# Patient Record
Sex: FEMALE | Race: WHITE | NOT HISPANIC OR LATINO | URBAN - METROPOLITAN AREA
[De-identification: names, ages, dates, MRNs, and addresses within clinical notes are randomized per-mention and may not be internally consistent; named-entity substitution may affect disease eponyms.]

---

## 2023-08-23 ENCOUNTER — HOSPITAL ENCOUNTER (OUTPATIENT)
Dept: DATA CONVERSION | Facility: HOSPITAL | Age: 38
Discharge: HOME | End: 2023-08-23

## 2023-08-23 DIAGNOSIS — R10.813 RIGHT LOWER QUADRANT ABDOMINAL TENDERNESS: ICD-10-CM

## 2023-08-24 ENCOUNTER — HOSPITAL ENCOUNTER (OUTPATIENT)
Dept: DATA CONVERSION | Facility: HOSPITAL | Age: 38
Discharge: HOME | End: 2023-08-24

## 2023-08-24 DIAGNOSIS — R22.32 LOCALIZED SWELLING, MASS AND LUMP, LEFT UPPER LIMB: ICD-10-CM

## 2023-09-15 VITALS
SYSTOLIC BLOOD PRESSURE: 108 MMHG | HEART RATE: 80 BPM | TEMPERATURE: 98.4 F | BODY MASS INDEX: 37.05 KG/M2 | WEIGHT: 217 LBS | HEIGHT: 64 IN | DIASTOLIC BLOOD PRESSURE: 60 MMHG | OXYGEN SATURATION: 99 %

## 2024-08-23 PROBLEM — R10.2 PELVIC PAIN IN FEMALE: Status: ACTIVE | Noted: 2024-08-23

## 2024-08-23 PROBLEM — E78.5 HYPERLIPIDEMIA: Status: ACTIVE | Noted: 2024-08-23

## 2024-08-23 PROBLEM — E66.9 OBESITY: Status: ACTIVE | Noted: 2024-08-23

## 2024-08-23 PROBLEM — F39 MOOD DISORDER (CMS-HCC): Status: ACTIVE | Noted: 2024-08-23

## 2024-08-23 PROBLEM — R22.32 NODULE OF SKIN OF LEFT UPPER EXTREMITY: Status: ACTIVE | Noted: 2024-08-23

## 2024-08-23 PROBLEM — M25.511 PAIN IN JOINT OF RIGHT SHOULDER: Status: ACTIVE | Noted: 2024-08-23

## 2024-08-23 PROBLEM — L25.9 CONTACT DERMATITIS: Status: ACTIVE | Noted: 2024-08-23

## 2024-08-23 PROBLEM — R26.2 DIFFICULTY WALKING: Status: ACTIVE | Noted: 2024-08-23

## 2024-08-23 PROBLEM — M25.551 HIP PAIN, RIGHT: Status: ACTIVE | Noted: 2024-08-23

## 2024-08-23 PROBLEM — G51.0 BELL'S PALSY: Status: ACTIVE | Noted: 2024-08-23

## 2024-08-23 PROBLEM — K58.9 IRRITABLE BOWEL SYNDROME WITHOUT DIARRHEA: Status: ACTIVE | Noted: 2024-08-23

## 2024-08-23 PROBLEM — M19.90 ARTHRITIS: Status: ACTIVE | Noted: 2024-08-23

## 2024-08-23 RX ORDER — DICYCLOMINE HYDROCHLORIDE 10 MG/1
CAPSULE ORAL
COMMUNITY
Start: 2022-12-13

## 2024-08-23 RX ORDER — LEVONORGESTREL AND ETHINYL ESTRADIOL 100-20(84)
KIT ORAL
COMMUNITY
End: 2024-08-26 | Stop reason: WASHOUT

## 2024-08-26 ENCOUNTER — APPOINTMENT (OUTPATIENT)
Dept: OBSTETRICS AND GYNECOLOGY | Facility: CLINIC | Age: 39
End: 2024-08-26
Payer: COMMERCIAL

## 2024-08-26 VITALS
DIASTOLIC BLOOD PRESSURE: 78 MMHG | BODY MASS INDEX: 38.73 KG/M2 | HEIGHT: 63 IN | SYSTOLIC BLOOD PRESSURE: 116 MMHG | WEIGHT: 218.6 LBS

## 2024-08-26 DIAGNOSIS — Z30.41 ENCOUNTER FOR SURVEILLANCE OF CONTRACEPTIVE PILLS: ICD-10-CM

## 2024-08-26 DIAGNOSIS — Z01.419 WELL WOMAN EXAM WITH ROUTINE GYNECOLOGICAL EXAM: Primary | ICD-10-CM

## 2024-08-26 DIAGNOSIS — Z12.4 CERVICAL CANCER SCREENING: ICD-10-CM

## 2024-08-26 DIAGNOSIS — R39.9 UTI SYMPTOMS: ICD-10-CM

## 2024-08-26 DIAGNOSIS — Z12.31 VISIT FOR SCREENING MAMMOGRAM: ICD-10-CM

## 2024-08-26 LAB
POC APPEARANCE, URINE: CLEAR
POC BILIRUBIN, URINE: NEGATIVE
POC BLOOD, URINE: NEGATIVE
POC COLOR, URINE: ABNORMAL
POC GLUCOSE, URINE: NEGATIVE MG/DL
POC KETONES, URINE: ABNORMAL MG/DL
POC LEUKOCYTES, URINE: NEGATIVE
POC NITRITE,URINE: NEGATIVE
POC PH, URINE: 6 PH
POC PROTEIN, URINE: ABNORMAL MG/DL
POC SPECIFIC GRAVITY, URINE: 1.01
POC UROBILINOGEN, URINE: 1 EU/DL

## 2024-08-26 PROCEDURE — 81003 URINALYSIS AUTO W/O SCOPE: CPT | Performed by: NURSE PRACTITIONER

## 2024-08-26 PROCEDURE — 99395 PREV VISIT EST AGE 18-39: CPT | Performed by: NURSE PRACTITIONER

## 2024-08-26 PROCEDURE — 88175 CYTOPATH C/V AUTO FLUID REDO: CPT

## 2024-08-26 PROCEDURE — 3008F BODY MASS INDEX DOCD: CPT | Performed by: NURSE PRACTITIONER

## 2024-08-26 PROCEDURE — 87086 URINE CULTURE/COLONY COUNT: CPT

## 2024-08-26 PROCEDURE — 87624 HPV HI-RISK TYP POOLED RSLT: CPT

## 2024-08-26 PROCEDURE — 1036F TOBACCO NON-USER: CPT | Performed by: NURSE PRACTITIONER

## 2024-08-26 RX ORDER — LEVONORGESTREL / ETHINYL ESTRADIOL AND ETHINYL ESTRADIOL 150-30(84)
1 KIT ORAL DAILY
Qty: 91 TABLET | Refills: 3 | Status: SHIPPED | OUTPATIENT
Start: 2024-08-26

## 2024-08-26 ASSESSMENT — ENCOUNTER SYMPTOMS
ALLERGIC/IMMUNOLOGIC NEGATIVE: 1
ABDOMINAL PAIN: 1
BACK PAIN: 1
RESPIRATORY NEGATIVE: 1
ENDOCRINE NEGATIVE: 1
PSYCHIATRIC NEGATIVE: 1
NEUROLOGICAL NEGATIVE: 1
CARDIOVASCULAR NEGATIVE: 1
CONSTITUTIONAL NEGATIVE: 1
HEMATOLOGIC/LYMPHATIC NEGATIVE: 1

## 2024-08-26 NOTE — PROGRESS NOTES
"Chief Complaint    Annual Exam        HPI    ANNUAL    DECLINE CHAPERONE    PAP 2021 NEG / NEG   MAMMO NEVER  DEXA NEVER  COLON NEVER  PAIN IN PEVIC AREA AND WOULD LIKE TO DISCUSS CHANGING HER ARMANDO TO ANOTHER OPTION   Last edited by Keke Duran MA on 2024 10:42 AM.         39 y.o. s female presents for annual well woman exam.   She is on COCs for birth control. Doing well on current pill, Seasonique. Wants to switch to Nexplanon, she forgets pills at times.   Patient's menstrual cycles are regular on the pill every 3 months, lasting 5-7 days. Denies abnormal bleeding.   She is sexually active with . Denies dyspareunia.   She denies any breast concerns.   Pap hx. normal. Last pap: 2021 negative and negative HPV.   Denies abnormal vaginal symptoms.  Reports symptoms of possible UTI including urinary frequency, pelvic discomfort. Uterine fibroid on CT scan last year.   Denies bowel concerns.   She is non-smoker. Reports +medicinal MJ use.   PMH: Multifocal choroiditis, obesity.   PSH: Appendectomy.   Family h/o breast cancer: Aunts  Family h/o GYN cancer: Uterine cancer - aunts who also had Non-Hodgkins lymphoma.   Family h/o colon cancer: None  Family hx also significant for uterine fibroids, and Hashimoto's.   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 Youngest has non-verbal autism.     /78   Ht 1.6 m (5' 3\")   Wt 99.2 kg (218 lb 9.6 oz)   LMP 08/10/2024 (Exact Date)   BMI 38.72 kg/m²      Current Outpatient Medications   Medication Instructions    dicyclomine (Bentyl) 10 mg capsule 1 capsules Orally Three times a day prn diarrhea for 5 day(s)    L norgest/e.estradioL-e.estrad 0.1 mg-20 mcg (84)/10 mcg (7) tablets,dose pack,3 month TAKE 1 TABLET BY MOUTH DAILY for 90        Review of Systems   Constitutional: Negative.    HENT: Negative.     Eyes:  Positive for visual disturbance.   Respiratory: Negative.     Cardiovascular: Negative.    Gastrointestinal:  Positive " for abdominal pain.   Endocrine: Negative.    Genitourinary: Negative.    Musculoskeletal:  Positive for back pain.   Skin: Negative.    Allergic/Immunologic: Negative.    Neurological: Negative.    Hematological: Negative.    Psychiatric/Behavioral: Negative.     All other systems reviewed and are negative.       Physical Exam  Constitutional:       Appearance: Normal appearance.   HENT:      Head: Normocephalic.      Nose: Nose normal.   Cardiovascular:      Rate and Rhythm: Normal rate and regular rhythm.   Pulmonary:      Effort: Pulmonary effort is normal.      Breath sounds: Normal breath sounds.   Chest:   Breasts:     Right: Normal.      Left: Normal.   Abdominal:      General: Abdomen is flat.      Palpations: Abdomen is soft.   Genitourinary:     General: Normal vulva.      Vagina: Normal.      Cervix: Normal.      Uterus: Normal. Enlarged (C/w uterine fibroid).       Adnexa: Right adnexa normal and left adnexa normal.      Rectum: Normal.      Comments: Pap collected  Musculoskeletal:         General: Normal range of motion.      Cervical back: Normal range of motion and neck supple.   Skin:     General: Skin is warm and dry.   Neurological:      Mental Status: She is alert.   Psychiatric:         Mood and Affect: Mood normal.         Behavior: Behavior normal.          Assessment/Plan:   1. Well woman exam with routine gynecological exam  -Pap test w/HPV testing collected today.   -Mammogram ordered. Self breast awareness exams reviewed.  -Advised yearly well woman exams.   -Follow up sooner if needed.     2. Cervical cancer screening  - THINPREP PAP    3. Visit for screening mammogram  - BI mammo bilateral screening tomosynthesis; Future    4. Encounter for surveillance of contraceptive pills  -Refills: L norgest/e.estradioL-e.estrad (Seasonique) 0.15 mg-30 mcg (84)/10 mcg (7) tablets,dose pack,3 month tablet; Take 1 tablet by mouth once daily.  Dispense: 91 tablet; Refill: 3  -Desires to switch to  Nexplanon. Pt to schedule to return for insertion. Continue on pill until appt and for at least 7 days after insertion to avoid gap in contraception.     5. UTI symptoms  -POCT UA Automated manually resulted  -Urine culture collected  -Will notify of results.  -Pelvic US discussed if needed for evaluation of uterine fibroid observed on CT last year.

## 2024-08-28 DIAGNOSIS — R10.2 PELVIC PAIN IN FEMALE: Primary | ICD-10-CM

## 2024-08-28 LAB — BACTERIA UR CULT: NORMAL

## 2024-09-05 ENCOUNTER — HOSPITAL ENCOUNTER (OUTPATIENT)
Dept: RADIOLOGY | Facility: HOSPITAL | Age: 39
Discharge: HOME | End: 2024-09-05
Payer: COMMERCIAL

## 2024-09-05 DIAGNOSIS — R10.2 PELVIC PAIN IN FEMALE: ICD-10-CM

## 2024-09-05 PROCEDURE — 76856 US EXAM PELVIC COMPLETE: CPT

## 2024-09-06 ENCOUNTER — APPOINTMENT (OUTPATIENT)
Dept: OBSTETRICS AND GYNECOLOGY | Facility: CLINIC | Age: 39
End: 2024-09-06
Payer: COMMERCIAL

## 2024-09-06 VITALS
DIASTOLIC BLOOD PRESSURE: 80 MMHG | SYSTOLIC BLOOD PRESSURE: 124 MMHG | BODY MASS INDEX: 39.87 KG/M2 | WEIGHT: 225 LBS | HEIGHT: 63 IN

## 2024-09-06 DIAGNOSIS — Z30.41 ENCOUNTER FOR SURVEILLANCE OF CONTRACEPTIVE PILLS: ICD-10-CM

## 2024-09-06 DIAGNOSIS — Z32.02 PREGNANCY TEST NEGATIVE: ICD-10-CM

## 2024-09-06 DIAGNOSIS — Z30.017 NEXPLANON INSERTION: Primary | ICD-10-CM

## 2024-09-06 LAB — PREGNANCY TEST URINE, POC: NEGATIVE

## 2024-09-06 NOTE — PROGRESS NOTES
"Patient ID: Mary Weiss \"Yemi" is a 39 y.o. female presents for Nexplanon insertion.  Switching from COCs, to implant.    /80   Ht 1.6 m (5' 3\")   Wt 102 kg (225 lb)   LMP 08/10/2024 (Exact Date)   BMI 39.86 kg/m²      Insertion of Contraceptive Capsule    Date/Time: 9/6/2024 1:07 PM    Performed by: HALEY Mcghee  Authorized by: HALEY Mcghee    Consent:     Consent obtained:  Verbal and written    Consent given by:  Patient    Procedural risks discussed:  Bleeding, possible continued pain, possible conversion to open surgery, failure rate, repeat procedure and infection    Patient questions answered: yes      Patient agrees, verbalizes understanding, and wants to proceed: yes    Universal Protocol:     Patient states understanding of procedure being performed: yes      Relevant documents present and verified: yes      Test results available and properly labeled: yes      Required blood products, implants, devices, and special equipment available: yes      Site marked: yes    Indication:     Indication: Insertion of non-biodegradable drug delivery implant    Pre-procedure:     Pre-procedure timeout performed: yes      Prepped with: povidone-iodine      Local anesthetic:  Lidocaine 1%    The site was cleaned and prepped in a sterile fashion: yes    Procedure:     Procedure:  Insertion    Left/right:  Left    Preloaded contraceptive capsule trocar was placed subdermally: yes      Visualization of implant was obtained: yes      Contraceptive capsule was inserted and trocar removed: yes      Visualization of notch in stylet and palpation of device: yes      Palpation confirms placement by provider and patient: yes      Site was closed with steri-strips and pressure bandage applied: yes    Comments:      Pt tolerated well.   "

## 2024-11-20 ENCOUNTER — OFFICE VISIT (OUTPATIENT)
Dept: PRIMARY CARE | Facility: CLINIC | Age: 39
End: 2024-11-20
Payer: COMMERCIAL

## 2024-11-20 VITALS
HEIGHT: 63 IN | DIASTOLIC BLOOD PRESSURE: 70 MMHG | SYSTOLIC BLOOD PRESSURE: 108 MMHG | OXYGEN SATURATION: 96 % | BODY MASS INDEX: 40.04 KG/M2 | WEIGHT: 226 LBS | HEART RATE: 102 BPM

## 2024-11-20 DIAGNOSIS — G56.01 CARPAL TUNNEL SYNDROME OF RIGHT WRIST: Primary | ICD-10-CM

## 2024-11-20 DIAGNOSIS — M79.644 PAIN OF RIGHT THUMB: ICD-10-CM

## 2024-11-20 PROCEDURE — 1036F TOBACCO NON-USER: CPT | Performed by: FAMILY MEDICINE

## 2024-11-20 PROCEDURE — 3008F BODY MASS INDEX DOCD: CPT | Performed by: FAMILY MEDICINE

## 2024-11-20 PROCEDURE — 99213 OFFICE O/P EST LOW 20 MIN: CPT | Performed by: FAMILY MEDICINE

## 2024-11-20 ASSESSMENT — ENCOUNTER SYMPTOMS
PAIN: 1
ARTHRALGIAS: 1

## 2024-11-20 ASSESSMENT — PAIN SCALES - GENERAL: PAINLEVEL_OUTOF10: 8

## 2024-11-20 NOTE — PROGRESS NOTES
"[facility]: [facility]  E/M EVALUATION    Mary Weiss \"Paulette\" is a 39 y.o. female who presents for Pain (RIGHT THUMB ongoing X 2 weeks).    Subjective   Pt here for right thumb pain. X 2 weeks.      Carpal tunnel.  Wants to see orthopedics.     Pain    Review of Systems   Musculoskeletal:  Positive for arthralgias.       Objective   Vitals:    11/20/24 1137   BP: 108/70   Pulse: 102   SpO2: 96%     Physical Exam  Constitutional:       Appearance: Normal appearance.   Cardiovascular:      Rate and Rhythm: Normal rate and regular rhythm.      Heart sounds: No murmur heard.  Pulmonary:      Effort: Pulmonary effort is normal.      Breath sounds: Normal breath sounds.   Musculoskeletal:      Right lower leg: No edema.      Left lower leg: No edema.      Comments: Right thumb, tender at MCP joint cabral service.    Neurological:      Mental Status: She is alert.         Assessment/Plan      Patient Active Problem List   Diagnosis   • Arthritis   • Bell's palsy   • Contact dermatitis   • Difficulty walking   • Hip pain, right   • Hyperlipidemia   • Irritable bowel syndrome without diarrhea   • Mood disorder (CMS-HCC)   • Nodule of skin of left upper extremity   • Pelvic pain in female   • Pain in joint of right shoulder   • Obesity       Diagnoses and all orders for this visit:  Carpal tunnel syndrome of right wrist  -     Referral to Orthopaedic Surgery; Future  Pain of right thumb  -     Referral to Orthopaedic Surgery; Future      The patient was encouraged to ensure that any/all documentation is accurate and up to date, and that our office be provided a copy in the event that anything changes.         Otis Alfonso MD   "

## 2025-01-08 ENCOUNTER — APPOINTMENT (OUTPATIENT)
Dept: PRIMARY CARE | Facility: CLINIC | Age: 40
End: 2025-01-08
Payer: COMMERCIAL

## 2025-01-17 ENCOUNTER — LAB (OUTPATIENT)
Dept: LAB | Facility: LAB | Age: 40
End: 2025-01-17
Payer: COMMERCIAL

## 2025-01-17 DIAGNOSIS — R69 DIAGNOSIS UNKNOWN: Primary | ICD-10-CM

## 2025-01-17 LAB
CCP IGG SERPL-ACNC: <1 U/ML
CRP SERPL-MCNC: 0.87 MG/DL
ERYTHROCYTE [SEDIMENTATION RATE] IN BLOOD BY WESTERGREN METHOD: 13 MM/H (ref 0–20)
RHEUMATOID FACT SER NEPH-ACNC: <10 IU/ML (ref 0–15)
TSH SERPL-ACNC: 1.31 MIU/L (ref 0.44–3.98)

## 2025-01-17 PROCEDURE — 86140 C-REACTIVE PROTEIN: CPT

## 2025-01-17 PROCEDURE — 86200 CCP ANTIBODY: CPT

## 2025-01-17 PROCEDURE — 86431 RHEUMATOID FACTOR QUANT: CPT

## 2025-01-17 PROCEDURE — 85652 RBC SED RATE AUTOMATED: CPT

## 2025-01-17 PROCEDURE — 84443 ASSAY THYROID STIM HORMONE: CPT

## 2025-01-17 PROCEDURE — 86038 ANTINUCLEAR ANTIBODIES: CPT

## 2025-01-21 LAB — ANA SER QL HEP2 SUBST: NEGATIVE

## 2025-05-14 ENCOUNTER — HOSPITAL ENCOUNTER (OUTPATIENT)
Dept: RADIOLOGY | Facility: HOSPITAL | Age: 40
Discharge: HOME | End: 2025-05-14
Payer: COMMERCIAL

## 2025-05-14 DIAGNOSIS — Z12.31 VISIT FOR SCREENING MAMMOGRAM: ICD-10-CM

## 2025-05-14 PROCEDURE — 77067 SCR MAMMO BI INCL CAD: CPT | Performed by: RADIOLOGY

## 2025-05-14 PROCEDURE — 77063 BREAST TOMOSYNTHESIS BI: CPT | Performed by: RADIOLOGY

## 2025-05-14 PROCEDURE — 77063 BREAST TOMOSYNTHESIS BI: CPT
